# Patient Record
Sex: FEMALE | Race: WHITE | Employment: OTHER | URBAN - METROPOLITAN AREA
[De-identification: names, ages, dates, MRNs, and addresses within clinical notes are randomized per-mention and may not be internally consistent; named-entity substitution may affect disease eponyms.]

---

## 2017-11-02 ENCOUNTER — OFFICE VISIT (OUTPATIENT)
Dept: PULMONOLOGY | Age: 67
End: 2017-11-02

## 2017-11-02 VITALS
OXYGEN SATURATION: 98 % | SYSTOLIC BLOOD PRESSURE: 139 MMHG | WEIGHT: 194 LBS | DIASTOLIC BLOOD PRESSURE: 84 MMHG | HEART RATE: 105 BPM | BODY MASS INDEX: 35.7 KG/M2 | HEIGHT: 62 IN | RESPIRATION RATE: 16 BRPM | TEMPERATURE: 97.5 F

## 2017-11-02 DIAGNOSIS — Z99.89 OSA ON CPAP: Primary | ICD-10-CM

## 2017-11-02 DIAGNOSIS — Z71.89 CPAP USE COUNSELING: ICD-10-CM

## 2017-11-02 DIAGNOSIS — R53.83 FATIGUE, UNSPECIFIED TYPE: ICD-10-CM

## 2017-11-02 DIAGNOSIS — G47.33 OSA ON CPAP: Primary | ICD-10-CM

## 2017-11-02 DIAGNOSIS — R68.2 DRY MOUTH: ICD-10-CM

## 2017-11-02 PROCEDURE — 99213 OFFICE O/P EST LOW 20 MIN: CPT | Performed by: NURSE PRACTITIONER

## 2017-11-02 ASSESSMENT — SLEEP AND FATIGUE QUESTIONNAIRES
ESS TOTAL SCORE: 14
HOW LIKELY ARE YOU TO NOD OFF OR FALL ASLEEP WHILE SITTING AND READING: 3
HOW LIKELY ARE YOU TO NOD OFF OR FALL ASLEEP WHILE SITTING QUIETLY AFTER LUNCH WITHOUT ALCOHOL: 2
HOW LIKELY ARE YOU TO NOD OFF OR FALL ASLEEP WHILE WATCHING TV: 3
HOW LIKELY ARE YOU TO NOD OFF OR FALL ASLEEP IN A CAR, WHILE STOPPED FOR A FEW MINUTES IN TRAFFIC: 0
HOW LIKELY ARE YOU TO NOD OFF OR FALL ASLEEP IN A CAR, WHILE STOPPED FOR A FEW MINUTES IN TRAFFIC: 0
HOW LIKELY ARE YOU TO NOD OFF OR FALL ASLEEP WHILE LYING DOWN TO REST IN THE AFTERNOON WHEN CIRCUMSTANCES PERMIT: 2
HOW LIKELY ARE YOU TO NOD OFF OR FALL ASLEEP WHILE LYING DOWN TO REST IN THE AFTERNOON WHEN CIRCUMSTANCES PERMIT: 2
ESS TOTAL SCORE: 10
HOW LIKELY ARE YOU TO NOD OFF OR FALL ASLEEP WHILE SITTING INACTIVE IN A PUBLIC PLACE: 2
HOW LIKELY ARE YOU TO NOD OFF OR FALL ASLEEP WHILE SITTING QUIETLY AFTER LUNCH WITHOUT ALCOHOL: 1
HOW LIKELY ARE YOU TO NOD OFF OR FALL ASLEEP WHILE SITTING AND READING: 3
HOW LIKELY ARE YOU TO NOD OFF OR FALL ASLEEP WHEN YOU ARE A PASSENGER IN A CAR FOR AN HOUR WITHOUT A BREAK: 1
HOW LIKELY ARE YOU TO NOD OFF OR FALL ASLEEP WHILE SITTING AND TALKING TO SOMEONE: 1
HOW LIKELY ARE YOU TO NOD OFF OR FALL ASLEEP WHILE SITTING INACTIVE IN A PUBLIC PLACE: 1
HOW LIKELY ARE YOU TO NOD OFF OR FALL ASLEEP WHILE WATCHING TV: 2
NECK CIRCUMFERENCE (INCHES): 17
HOW LIKELY ARE YOU TO NOD OFF OR FALL ASLEEP WHILE SITTING AND TALKING TO SOMEONE: 0
HOW LIKELY ARE YOU TO NOD OFF OR FALL ASLEEP WHEN YOU ARE A PASSENGER IN A CAR FOR AN HOUR WITHOUT A BREAK: 1

## 2017-11-02 NOTE — PROGRESS NOTES
.Planning to get Influenza vaccine at PCP office today,  Orders verbalized by Gualberto Stroud CNP;  1 yr F/U  Supply orders faxed to [facility]: all masks, mask fitting, chin strap, heated tubing

## 2017-11-02 NOTE — PATIENT INSTRUCTIONS
Some people find it useful to assign a \"worry period\" during the evening or afternoon for these issues. CPAP Equipment Cleaning and Disinfecting Schedule  Equipment Cleaning Frequency Instructions  Disinfecting Frequency   Non-Disposable Filters  Weekly Mild soapy water, Rinse, Air Dry Not Required   Disposable Filters Change as needed  2-4 weeks Do Not Wash Not Required   Hose/tubing Daily Mild soapy water, Rinse, Air Dry Once a week   Mask / Nasal Pillows Daily Mild soapy water, Rinse, Air Dry Once a week   Headgear Weekly Hand wash, Mild soapy water, Rinse, Dry  Not Required   Humidifier Daily Empty water daily  Mild soapy water, Rinse well, Air Dry  Once a week   CPAP Unit As Needed Dust with damp cloth,  No detergents or sprays Not Required         Disinfect (per schedule) with 1 part white vinegar and 3 parts water- soak mask and water chamber for 30 minutes every 1-2 weeks, more often if sick. Allow water/vinegar mixture to run through tubing. Allow all equipment to air dry. Drying Hints:   Always hang tubing away from direct sunlight, as this will cause the tubing to become yellow, brittle and crack over a period of time. DO NOT attach the wet tubing to your CPAP unit to blow-dry it. The moisture from the tubing can drain back into your machine. Moisture in your unit can cause sudden pressure increases or short circuits  DO's and DON'Ts:  - Don't use alcohol-based products to clean your mask, because it can cause the materials to become hard and brittle. - Don't put headgear in the washer or dryer  - Don't use any caustic or household cleaning solutions such as bleach on your CPAP   equipment.  - Do follow the recommended cleaning schedule. - Do change your disposable filter frequently. Adapted From: [email or web address]. shtm.   These are general suggestions for all models please follow specific s recommendations and specific instructions

## 2018-10-29 ENCOUNTER — TELEPHONE (OUTPATIENT)
Dept: PULMONOLOGY | Age: 68
End: 2018-10-29

## 2018-12-18 ENCOUNTER — OFFICE VISIT (OUTPATIENT)
Dept: PULMONOLOGY | Age: 68
End: 2018-12-18
Payer: COMMERCIAL

## 2018-12-18 VITALS
HEART RATE: 79 BPM | OXYGEN SATURATION: 97 % | WEIGHT: 194 LBS | SYSTOLIC BLOOD PRESSURE: 157 MMHG | DIASTOLIC BLOOD PRESSURE: 98 MMHG | RESPIRATION RATE: 16 BRPM | TEMPERATURE: 98.1 F | BODY MASS INDEX: 35.7 KG/M2 | HEIGHT: 62 IN

## 2018-12-18 DIAGNOSIS — E66.9 OBESITY (BMI 30-39.9): ICD-10-CM

## 2018-12-18 DIAGNOSIS — G47.33 OSA ON CPAP: Primary | ICD-10-CM

## 2018-12-18 DIAGNOSIS — Z99.89 OSA ON CPAP: Primary | ICD-10-CM

## 2018-12-18 DIAGNOSIS — R68.2 DRY MOUTH: ICD-10-CM

## 2018-12-18 DIAGNOSIS — Z71.89 CPAP USE COUNSELING: ICD-10-CM

## 2018-12-18 PROCEDURE — 99213 OFFICE O/P EST LOW 20 MIN: CPT | Performed by: NURSE PRACTITIONER

## 2018-12-18 ASSESSMENT — SLEEP AND FATIGUE QUESTIONNAIRES
HOW LIKELY ARE YOU TO NOD OFF OR FALL ASLEEP WHEN YOU ARE A PASSENGER IN A CAR FOR AN HOUR WITHOUT A BREAK: 1
HOW LIKELY ARE YOU TO NOD OFF OR FALL ASLEEP WHILE SITTING AND TALKING TO SOMEONE: 0
HOW LIKELY ARE YOU TO NOD OFF OR FALL ASLEEP IN A CAR, WHILE STOPPED FOR A FEW MINUTES IN TRAFFIC: 0
NECK CIRCUMFERENCE (INCHES): 17
HOW LIKELY ARE YOU TO NOD OFF OR FALL ASLEEP WHILE LYING DOWN TO REST IN THE AFTERNOON WHEN CIRCUMSTANCES PERMIT: 2
HOW LIKELY ARE YOU TO NOD OFF OR FALL ASLEEP WHILE SITTING INACTIVE IN A PUBLIC PLACE: 1
HOW LIKELY ARE YOU TO NOD OFF OR FALL ASLEEP WHILE SITTING AND READING: 2
HOW LIKELY ARE YOU TO NOD OFF OR FALL ASLEEP WHILE SITTING QUIETLY AFTER LUNCH WITHOUT ALCOHOL: 0
HOW LIKELY ARE YOU TO NOD OFF OR FALL ASLEEP WHILE WATCHING TV: 2
ESS TOTAL SCORE: 8

## 2018-12-18 NOTE — PATIENT INSTRUCTIONS
Please keep all of your future appointments scheduled by [facility] [address], [address] Pulmonary and Sleep. Remember to bring your sleep machine, cord and mask to each appointment    You should have a follow up appointment scheduled with a sleep medicine provider within 12 months. Routine parts, masks, tubing and filters should all be obtainable from your DME (equipment) provider. Any problems related to mask fit, comfort or functioning of equipment should also be addressed directly with your DME provider. If you are unable to resolve problems, then please notify our office and schedule an appointment to be seen sooner than the usual follow up appointment. For all patients who are evaluated for sleep disorders, never drive or operate motorized equipment while sleepy, fatigued or groggy. Please bring in all of your sleep equipment to all of your appointments, including machine, mask, cords and hoses. Here are some tips to to getting better sleep  1- Avoid napping during the day: This will ensure you are tired at bedtime. If you have to take a nap, sleep less than one hour, before 3 pm.   2- Exercise regularly, but not right before bed: but the timing of the workout is important. Exercising in the morning or early afternoon will not interfere with sleep. Exercising within two hours before bedtime can decrease your ability to fall asleep. Regular exercise is recommended to help you deepen the sleep. 3- Avoid heavy, spicy, or sugary foods 4-6 hours before bedtime: These can affect your ability to stay asleep. 4- Have a light snack before bed: Having an empty stomach can interfere with your sleep. Dairy products and turkey contain tryptophan, which acts as a natural sleep inducer. 5- Stay away from caffeine, nicotine and alcohol at least 4-6 hours before bed: Caffeine and nicotine are stimulants that interfere with your ability to fall asleep.  While alcohol has an immediate sleep-inducing

## 2019-12-17 ENCOUNTER — TELEPHONE (OUTPATIENT)
Dept: PULMONOLOGY | Age: 69
End: 2019-12-17

## 2020-01-21 ENCOUNTER — OFFICE VISIT (OUTPATIENT)
Dept: PULMONOLOGY | Age: 70
End: 2020-01-21
Payer: MEDICARE

## 2020-01-21 VITALS
HEART RATE: 82 BPM | HEIGHT: 62 IN | DIASTOLIC BLOOD PRESSURE: 82 MMHG | WEIGHT: 198 LBS | BODY MASS INDEX: 36.44 KG/M2 | OXYGEN SATURATION: 97 % | TEMPERATURE: 97.8 F | RESPIRATION RATE: 16 BRPM | SYSTOLIC BLOOD PRESSURE: 136 MMHG

## 2020-01-21 PROCEDURE — 99213 OFFICE O/P EST LOW 20 MIN: CPT | Performed by: NURSE PRACTITIONER

## 2020-01-21 RX ORDER — ALBUTEROL SULFATE 90 UG/1
2 AEROSOL, METERED RESPIRATORY (INHALATION)
COMMUNITY

## 2020-01-21 ASSESSMENT — SLEEP AND FATIGUE QUESTIONNAIRES
ESS TOTAL SCORE: 9
HOW LIKELY ARE YOU TO NOD OFF OR FALL ASLEEP IN A CAR, WHILE STOPPED FOR A FEW MINUTES IN TRAFFIC: 0
HOW LIKELY ARE YOU TO NOD OFF OR FALL ASLEEP WHILE SITTING INACTIVE IN A PUBLIC PLACE: 0
NECK CIRCUMFERENCE (INCHES): 15.5
HOW LIKELY ARE YOU TO NOD OFF OR FALL ASLEEP WHILE LYING DOWN TO REST IN THE AFTERNOON WHEN CIRCUMSTANCES PERMIT: 3
HOW LIKELY ARE YOU TO NOD OFF OR FALL ASLEEP WHILE SITTING AND READING: 3
HOW LIKELY ARE YOU TO NOD OFF OR FALL ASLEEP WHILE WATCHING TV: 3
HOW LIKELY ARE YOU TO NOD OFF OR FALL ASLEEP WHEN YOU ARE A PASSENGER IN A CAR FOR AN HOUR WITHOUT A BREAK: 0
HOW LIKELY ARE YOU TO NOD OFF OR FALL ASLEEP WHILE SITTING QUIETLY AFTER LUNCH WITHOUT ALCOHOL: 0
HOW LIKELY ARE YOU TO NOD OFF OR FALL ASLEEP WHILE SITTING AND TALKING TO SOMEONE: 0

## 2020-01-21 NOTE — PROGRESS NOTES
lb (89.8 kg), SpO2 97 %.' on RA  Gen: No acute distress. Obese. BMI of 36.21  Eyes: PERRL. No sclera icterus. No conjunctival injection. ENT: No discharge. Pharynx clear. Mallampati class IV. Neck: Trachea midline. No obvious mass. Neck circumference 15.5\"  Resp: No accessory muscle use. No crackles. No wheezes. No rhonchi. CV: Regular rate. Regular rhythm. No murmur or rub. Skin: Warm and dry. M/S: No cyanosis. Neuro: Awake. Alert. Moves all four extremities. Psych: Oriented x 3. No anxiety. DATA :   11/30/2005- PSG AHI 43.1, low SpO2 66%  1/18/2006- titration-controlled sleep related breathing with CPAP 6 cm H2O  9/16/16- CPAP titration Controlled sleep related breathing with CPAP, PLMS 29.7, recommendations auto CPAP 7-11 cm H2O    CPAP compliance data:  Compliance download report from 10/3/17to 11/1/17 reviewed today by me and showed patient is using machine 6:33 hrs/night with 96.7% compliance and AHI 1.2 within this time frame. 29/30days with greater than 4 hours of machine use. 90% pressure 8.2 cm H20 on auto CPAP 7-11 cm H2O    Compliance download report from 11/17/18 to 12/16/18 reviewed today by me and showed patient is using machine 6:10 hrs/night with 86.7% compliance and AHI 1.0 within this time frame. 25/30days with greater than 4 hours of machine use. 90% pressure 8.5 cm H20 on auto CPAP 7-11 cm H2O    Compliance download report from 12/22/19 to 1/20/20 reviewed today by me and showed patient is using machine 8:20 hrs/night with 100% compliance and AHI 1.2 within this time frame. 30/30days with greater than 4 hours of machine use. 90% pressure 8.4 cm H20 on auto CPAP7-11 cm H2O      Assessment:       · Severe KENDELL. Auto CPAP 7-11 cmH2O. Nasal pillow. Optimal compliance and efficacy on review today.      · Snoring and witnessed apnea without CPAP  · Hypersomnia-improving  · Occasional dry mouth with CPAP      Plan:      · Continue auto CPAP 7-11 cm H2O   · Increase sleep time to at least 7 preferably 8 hours nightly  · Order heated tubing  · Order chin strap   · Can try different mask for comfort if patient wishes- mask fitting at [facility] [place]. Eulah Shine may be good option   · Advised to use CPAP 6-8 hrs at night and during naps. · Replacement of mask, tubing, head straps every 3-6 months or sooner if damaged. · Follow up CPAP compliance and pressure adjustment if needed  · Sleep hygiene  · Avoid sedatives, alcohol and caffeinated drinks at bed time. · No driving motorized vehicles or operating heavy machinery while fatigue, drowsy or sleepy. · Weight loss is also recommended as a long-term intervention. · Complications of KENDELL if not treated were discussed with patient patient to include systemic hypertension, pulmonary hypertension, cardiovascular morbidities, car accidents and all cause mortality.   · Patient education handout provided regarding sleep tips and CPAP cleaning recommendations       Follow up 1 year sooner if needed

## 2020-12-09 ENCOUNTER — TELEPHONE (OUTPATIENT)
Dept: PULMONOLOGY | Age: 70
End: 2020-12-09

## 2020-12-09 NOTE — TELEPHONE ENCOUNTER
limited to the following:    Your Provider(s) may not able to provide medical treatment for your particular condition and you may be required to seek alternative healthcare or emergency care services.  The electronic systems or other security protocols or safeguards used in the Service could fail, causing a breach of privacy of your medical or other information.  Given regulatory requirements in certain jurisdictions, your Provider(s) diagnosis and/or treatment options, especially pertaining to certain prescriptions, may be limited. Acceptance   1. You understand that Services will be provided via Telehealth. This process involves the use of HIPAA compliant and secure, real-time audio-visual interfacing with a qualified and appropriately trained provider located at [facility]. 2. You understand that, under no circumstances, will this session be recorded. 3. You understand that the Provider(s) at [facility] and other clinical participants will be party to the information obtained during the Telehealth session in accordance with best medical practices. 4. You understand that the information obtained during the Telehealth session will be used to help determine the most appropriate treatment options. 5. You understand that You have the right to revoke this consent at any point in time. 6. You understand that Telehealth is voluntary, and that continued treatment is not dependent upon consent. 7. You understand that, in the event of non-consent to Telehealth services and/or technical difficulties, you will obtain services as typically provided in the absence of Telehealth technology. 8. You understand that this consent will be kept in Your medical record. 9. No potential benefits from the use of Telehealth or specific results can be guaranteed. Your condition may not be cured or improved and, in some cases, may get worse.    10. There are limitations in the terms described in the Terms of Service and this Telehealth Consent. The patient was read the following statement and has consented to the visit as of 12/9/20. The patient has been scheduled for their first telehealth visit on 12/10/20 with [facility].

## 2020-12-10 ENCOUNTER — VIRTUAL VISIT (OUTPATIENT)
Dept: PULMONOLOGY | Age: 70
End: 2020-12-10
Payer: MEDICARE

## 2020-12-10 PROCEDURE — 99442 PR PHYS/QHP TELEPHONE EVALUATION 11-20 MIN: CPT | Performed by: NURSE PRACTITIONER

## 2020-12-10 RX ORDER — IBUPROFEN 800 MG/1
800 TABLET ORAL EVERY 6 HOURS PRN
COMMUNITY

## 2020-12-10 RX ORDER — SIMVASTATIN 40 MG
40 TABLET ORAL NIGHTLY
COMMUNITY

## 2020-12-10 ASSESSMENT — SLEEP AND FATIGUE QUESTIONNAIRES
HOW LIKELY ARE YOU TO NOD OFF OR FALL ASLEEP WHILE LYING DOWN TO REST IN THE AFTERNOON WHEN CIRCUMSTANCES PERMIT: 0
ESS TOTAL SCORE: 3
HOW LIKELY ARE YOU TO NOD OFF OR FALL ASLEEP WHILE SITTING AND READING: 2
HOW LIKELY ARE YOU TO NOD OFF OR FALL ASLEEP WHEN YOU ARE A PASSENGER IN A CAR FOR AN HOUR WITHOUT A BREAK: 1
HOW LIKELY ARE YOU TO NOD OFF OR FALL ASLEEP WHILE WATCHING TV: 0
HOW LIKELY ARE YOU TO NOD OFF OR FALL ASLEEP WHILE SITTING INACTIVE IN A PUBLIC PLACE: 0
HOW LIKELY ARE YOU TO NOD OFF OR FALL ASLEEP IN A CAR, WHILE STOPPED FOR A FEW MINUTES IN TRAFFIC: 0
HOW LIKELY ARE YOU TO NOD OFF OR FALL ASLEEP WHILE SITTING AND TALKING TO SOMEONE: 0
HOW LIKELY ARE YOU TO NOD OFF OR FALL ASLEEP WHILE SITTING QUIETLY AFTER LUNCH WITHOUT ALCOHOL: 0

## 2020-12-10 NOTE — PATIENT INSTRUCTIONS

## 2020-12-10 NOTE — PROGRESS NOTES
CHIEF COMPLAINT: KENDELL    Consulting provider: Sharlotte Apley, APRN - CNP    Corinne Wilder Baker is a 79 y.o. female for telephone only virtual visit for KENDELL follow up. States she is doing okay with CPAP. States mask is annoying, tubing gets in the way. States knows she benefits from CPAP, has HA if doesn't use it. Patient is using CPAP  8-9 hrs/night. Using humidifier. No snoring on CPAP. The pressure is well tolerated. The mask is not very comfortable- nasal pillows. No mask leak. No significant daytime sleepiness. No nodding off when driving. Minimal dry mouth has a chin strap but does not use. No fatigue. Bedtime is 10-11 pm and rise time is 8 am. Sleep onset is few minutes. Wakes up 1-2 times at night total. 1-2 nocturia. It takes usually few minutes to fall back a sleep. Rare naps during the day. No headache in am. No weight gain. 0-1 caffienated beverages during the day. No alcohol. ESS is 3. Past Medical History:   Diagnosis Date    Arthritis     Hypertension     KENDELL on CPAP     Pneumonia        Past Surgical History:        Procedure Laterality Date    HERNIA REPAIR      KNEE SURGERY         Allergies:  is allergic to clindamycin and naproxen. Social History:    TOBACCO:   reports that she has never smoked. She has never used smokeless tobacco.  ETOH:   reports no history of alcohol use. Family History:   No KENDELL    Current Medications:    Current Outpatient Medications:     albuterol sulfate  (90 Base) MCG/ACT inhaler, Inhale 2 puffs into the lungs, Disp: , Rfl:     diclofenac sodium 1 % GEL, 4 g by Intratympanic route, Disp: , Rfl:     Oxaprozin (DAYPRO PO), Take by mouth as needed, Disp: , Rfl:     Amlodipine Besy-Benazepril HCl (LOTREL PO), Take 1 tablet by mouth daily. , Disp: , Rfl:     fexofenadine (ALLEGRA) 180 MG tablet, Take 180 mg by mouth daily.   , Disp: , Rfl:       Objective:   PHYSICAL EXAM:    There were no vitals taken for this visit.' on RA      DATA : 11/30/2005- PSG AHI 43.1, low SpO2 66%  1/18/2006- titration-controlled sleep related breathing with CPAP 6 cm H2O  9/16/16- CPAP titration Controlled sleep related breathing with CPAP, PLMS 29.7, recommendations auto CPAP 7-11 cm H2O    CPAP compliance data:  Compliance download report from 10/3/17to 11/1/17 reviewed today by me and showed patient is using machine 6:33 hrs/night with 96.7% compliance and AHI 1.2 within this time frame. 29/30days with greater than 4 hours of machine use. 90% pressure 8.2 cm H20 on auto CPAP 7-11 cm H2O    Compliance download report from 11/17/18 to 12/16/18 reviewed today by me and showed patient is using machine 6:10 hrs/night with 86.7% compliance and AHI 1.0 within this time frame. 25/30days with greater than 4 hours of machine use. 90% pressure 8.5 cm H20 on auto CPAP 7-11 cm H2O    Compliance download report from 12/22/19 to 1/20/20 reviewed today by me and showed patient is using machine 8:20 hrs/night with 100% compliance and AHI 1.2 within this time frame. 30/30days with greater than 4 hours of machine use. 90% pressure 8.4 cm H20 on auto CPAP7-11 cm H2O    Compliance download report from 11/7/20 to 12/6/20 reviewed today by me and showed patient is using machine 8:43 hrs/night with 100% compliance and AHI 1.6 within this time frame. 30/30days with greater than 4 hours of machine use. 90% pressure 8.4 cm H20 on auto CPAP 7-11 cm H2O    Assessment:       · Severe KENDELL. Auto CPAP 7-11 cmH2O. Nasal pillow. Optimal compliance and efficacy on review today. · Snoring and witnessed apnea without CPAP  · Hypersomnia-improving  · Occasional dry mouth with CPAP      Plan:      · Continue auto CPAP 7-11 cm H2O   · Order heated tubing  · Can try different mask for comfort if patient wishes- mask fitting at [facility] [place]. Dreamwear nasal mask/ ResMed N30i may be good option as tubing comes from the top  · Advised to use CPAP 6-8 hrs at night and during naps.    · Replacement of mask, tubing, head straps every 3-6 months or sooner if damaged. · Follow up CPAP compliance and pressure adjustment if needed  · Sleep hygiene  · Avoid sedatives, alcohol and caffeinated drinks at bed time. · No driving motorized vehicles or operating heavy machinery while fatigue, drowsy or sleepy. · Weight loss is also recommended as a long-term intervention. · Complications of KENDELL if not treated were discussed with patient patient to include systemic hypertension, pulmonary hypertension, cardiovascular morbidities, car accidents and all cause mortality. · Patient education  regarding sleep tips and CPAP cleaning recommendations       Follow up 1 year sooner if needed    Consent for telehealth visit was obtained and is noted in chart    Jovanna Miranda is a 79 y.o. female evaluated via telephone on 12/10/2020. I affirm this is a Patient Initiated Episode with a Patient who has not had a related appointment within my department in the past 7 days or scheduled within the next 24 hours.     Patient identification was verified at the start of the visit: Yes    Total Time: minutes: 11-20 minutes    Note: not billable if this call serves to triage the patient into an appointment for the relevant concern      Emeka Byrne

## 2021-12-15 ENCOUNTER — VIRTUAL VISIT (OUTPATIENT)
Dept: PULMONOLOGY | Age: 71
End: 2021-12-15
Payer: MEDICARE

## 2021-12-15 ENCOUNTER — TELEPHONE (OUTPATIENT)
Dept: PULMONOLOGY | Age: 71
End: 2021-12-15

## 2021-12-15 DIAGNOSIS — Z71.89 CPAP USE COUNSELING: ICD-10-CM

## 2021-12-15 DIAGNOSIS — E66.9 OBESITY (BMI 30-39.9): ICD-10-CM

## 2021-12-15 DIAGNOSIS — G47.33 SEVERE OBSTRUCTIVE SLEEP APNEA: Primary | ICD-10-CM

## 2021-12-15 PROCEDURE — 99213 OFFICE O/P EST LOW 20 MIN: CPT | Performed by: NURSE PRACTITIONER

## 2021-12-15 ASSESSMENT — SLEEP AND FATIGUE QUESTIONNAIRES
HOW LIKELY ARE YOU TO NOD OFF OR FALL ASLEEP WHILE WATCHING TV: 1
ESS TOTAL SCORE: 9
HOW LIKELY ARE YOU TO NOD OFF OR FALL ASLEEP IN A CAR, WHILE STOPPED FOR A FEW MINUTES IN TRAFFIC: 0
HOW LIKELY ARE YOU TO NOD OFF OR FALL ASLEEP WHILE LYING DOWN TO REST IN THE AFTERNOON WHEN CIRCUMSTANCES PERMIT: 3
HOW LIKELY ARE YOU TO NOD OFF OR FALL ASLEEP WHILE SITTING AND TALKING TO SOMEONE: 0
HOW LIKELY ARE YOU TO NOD OFF OR FALL ASLEEP WHILE SITTING AND READING: 1
HOW LIKELY ARE YOU TO NOD OFF OR FALL ASLEEP WHILE SITTING QUIETLY AFTER LUNCH WITHOUT ALCOHOL: 3
HOW LIKELY ARE YOU TO NOD OFF OR FALL ASLEEP WHILE SITTING INACTIVE IN A PUBLIC PLACE: 0
HOW LIKELY ARE YOU TO NOD OFF OR FALL ASLEEP WHEN YOU ARE A PASSENGER IN A CAR FOR AN HOUR WITHOUT A BREAK: 1

## 2021-12-15 NOTE — PROGRESS NOTES
CHIEF COMPLAINT: KENDELL    Consulting provider: ISAIAH Beyer - CNP Corinne L Geovanni Higgins is a 70 y.o. female for televideo appointment via video and audio doxy. me virtual visit for KENDELL follow up. States she is doing pretty well with CPAP. Patient is using CPAP   7-8 hrs/night. Using humidifier. No snoring on CPAP. The pressure is well tolerated. The mask is somewhat comfortable- nasal mask dreamwear. No mask leak. No significant daytime sleepiness. Denies nodding off when driving. +dry mouth at times, uses biotene with some benefit. +fatigue. Bedtime is 11 pm and rise time is 730-8 am. Sleep onset is usually few minutes. Wakes up 3 times at night total. 3 nocturia. States she did also just have surgery and is not sleeping well due to this. It takes usually few minutes to fall back a sleep. 1 naps during the day- 20 minutes. No headache in am. No weight gain. No caffienated beverages during the day. No alcohol. ESS is 9        Past Medical History:   Diagnosis Date    Arthritis     Hypertension     KENDELL on CPAP     Pneumonia        Past Surgical History:        Procedure Laterality Date    HERNIA REPAIR      KNEE SURGERY         Allergies:  is allergic to clindamycin and naproxen. Social History:    TOBACCO:   reports that she has never smoked. She has never used smokeless tobacco.  ETOH:   reports no history of alcohol use.       Family History:   No KENDELL    Current Medications:    Current Outpatient Medications:     simvastatin (ZOCOR) 40 MG tablet, Take 40 mg by mouth nightly, Disp: , Rfl:     ibuprofen (ADVIL;MOTRIN) 800 MG tablet, Take 800 mg by mouth every 6 hours as needed for Pain, Disp: , Rfl:     albuterol sulfate  (90 Base) MCG/ACT inhaler, Inhale 2 puffs into the lungs, Disp: , Rfl:     diclofenac sodium 1 % GEL, 4 g by Intratympanic route, Disp: , Rfl:     Oxaprozin (DAYPRO PO), Take by mouth as needed, Disp: , Rfl:     Amlodipine Besy-Benazepril HCl (LOTREL PO), Take 1 tablet by mouth daily. , Disp: , Rfl:     fexofenadine (ALLEGRA) 180 MG tablet, Take 180 mg by mouth daily. , Disp: , Rfl:       Objective:   PHYSICAL EXAM:    There were no vitals taken for this visit.' on RA  Exam:  Gen: No acute distress, does not appear to be in pain. Appears well developed and nourished. HENT: Head is normocephalic and atraumatic. Normal appearing nose. External Ears normal.   Neck: No visualized mass. Trachea is midline   Eyes: EOM intact. No visible discharge. Resp:No visualized signs of difficulty breathing or respiratory distress, speaking in full sentences. Respiratory effort normal.  Neuro: Awake. Alert. Able to follow commands. No facial asymmetry. Skin: No significant lesions or discoloration noted on facial skin    Musculoskeletal: Normal range of motion of the neck. Psych: Oriented x 3. No anxiety. Normal affect. DATA :   11/30/2005- PSG AHI 43.1, low SpO2 66%  1/18/2006- titration-controlled sleep related breathing with CPAP 6 cm H2O  9/16/16- CPAP titration Controlled sleep related breathing with CPAP, PLMS 29.7, recommendations auto CPAP 7-11 cm H2O    CPAP compliance data:  Compliance download report from 10/3/17to 11/1/17 reviewed today by me and showed patient is using machine 6:33 hrs/night with 96.7% compliance and AHI 1.2 within this time frame. 29/30days with greater than 4 hours of machine use. 90% pressure 8.2 cm H20 on auto CPAP 7-11 cm H2O    Compliance download report from 11/17/18 to 12/16/18 reviewed today by me and showed patient is using machine 6:10 hrs/night with 86.7% compliance and AHI 1.0 within this time frame. 25/30days with greater than 4 hours of machine use. 90% pressure 8.5 cm H20 on auto CPAP 7-11 cm H2O    Compliance download report from 12/22/19 to 1/20/20 reviewed today by me and showed patient is using machine 8:20 hrs/night with 100% compliance and AHI 1.2 within this time frame.   30/30days with greater than 4 hours of machine use.  90% pressure 8.4 cm H20 on auto CPAP7-11 cm H2O    Compliance download report from 11/7/20 to 12/6/20 reviewed today by me and showed patient is using machine 8:43 hrs/night with 100% compliance and AHI 1.6 within this time frame. 30/30days with greater than 4 hours of machine use. 90% pressure 8.4 cm H20 on auto CPAP 7-11 cm H2O    Compliance download report from 11/11/21 to 12/12/21 reviewed today by me and showed patient is using machine 7:23 hrs/night with 94% compliance and AHI 1.9 within this time frame. 30/32days with greater than 4 hours of machine use. 90% pressure 8.2 cm H20 on auto CPAP 7-11 cm H2O    Assessment:       · Severe KENDELL. Auto CPAP 7-11 cmH2O. Nasal pillow. Optimal compliance and efficacy on review today. · Snoring and witnessed apnea without CPAP  · Hypersomnia-improving  · Occasional dry mouth with CPAP      Plan:      · Continue auto CPAP 7-11 cm H2O   · Heated tubing  · Try chin strap- patient states she has one  · May need to check with DME for mask fit  · Advised to use CPAP 6-8 hrs at night and during naps. · Replacement of mask, tubing, head straps every 3-6 months or sooner if damaged. · Follow up CPAP compliance and pressure adjustment if needed  · Sleep hygiene  · Avoid sedatives, alcohol and caffeinated drinks at bed time. · No driving motorized vehicles or operating heavy machinery while fatigue, drowsy or sleepy. · Weight loss is also recommended as a long-term intervention. · Complications of KENDELL if not treated were discussed with patient patient to include systemic hypertension, pulmonary hypertension, cardiovascular morbidities, car accidents and all cause mortality. · Patient education  regarding sleep tips and CPAP cleaning recommendations     -Discussed [employer] recently recalled some Pap units. Patient encouraged to call DME/ to see if her unit is on recall and register it if so.   Discussed risks/benefits of untreated sleep apnea as well as risks/benefits of use of unit if recalled. At this time, if patients have moderate to severe sleep apnea or have severe daytime sleepiness, it is recommended continue therapy until the machine can be replaced. Based upon the information we have so far, it appears the risk of stopping therapy may be higher than the risks associated with foam degradation. However, there are still many unknown variables and each patient will have to make a final determination on his or her own. Discussed alternative treatment options. She declines new CPAP at this time. States plans to continue current CPAP until can get a replacement    Please only use cleaning methods recommended by . Follow up 1 year sooner if needed  Consent for telehealth visit was obtained and is noted in chart      C/ [address]. Indira Bailey is a 70 y.o. female being evaluated by a Virtual Visit (video visit) encounter to address concerns as mentioned above. A caregiver was present when appropriate. Due to this being a TeleHealth encounter (During [place] [address] public health emergency), evaluation of the following organ systems was limited: Vitals/Constitutional/EENT/Resp/CV/GI//MS/Neuro/Skin/Heme-Lymph-Imm. Pursuant to the emergency declaration under the [facility], [address] [place] [address] authority and the [employer] and [employer] Act, this Virtual Visit was conducted with patient's (and/or legal guardian's) consent, to reduce the patient's risk of exposure to COVID-19 and provide necessary medical care. The patient (and/or legal guardian) has also been advised to contact this office for worsening conditions or problems, and seek emergency medical treatment and/or call 911 if deemed necessary.      Patient identification was verified at the start of the visit: Yes    Total time spent for this encounter: Not billed by time    Services were provided through a video synchronous discussion virtually to substitute for in-person clinic visit. Patient and provider were located at their individual homes. --ISAIHA Guevara CNP on 12/15/2021 at 10:01 AM    An electronic signature was used to authenticate this note.

## 2021-12-15 NOTE — PATIENT INSTRUCTIONS

## 2021-12-15 NOTE — TELEPHONE ENCOUNTER
Within this Telehealth Consent, the terms you and yours refer to the person using the Telehealth Service (Service), or in the case of a use of the Service by or on behalf of a minor, you and yours refer to and include (i) the parent or legal guardian who provides consent to the use of the Service by such minor or uses the Service on behalf of such minor, and (ii) the minor for whom consent is being provided or on whose behalf the Service is being utilized. When using Service, you will be consulting with your health care providers via the use of Telehealth.   Telehealth involves the delivery of healthcare services using electronic communications, information technology or other means between a healthcare provider and a patient who are not in the same physical location. Telehealth may be used for diagnosis, treatment, follow-up and/or patient education, and may include, but is not limited to, one or more of the following:    Electronic transmission of medical records, photo images, personal health information or other data between a patient and a healthcare provider    Interactions between a patient and healthcare provider via audio, video and/or data communications    Use of output data from medical devices, sound and video files    Anticipated Benefits   The use of Telehealth by your Provider(s) through the Service may have the following possible benefits:    Making it easier and more efficient for you to access medical care and treatment for the conditions treated by such Provider(s) utilizing the Service    Allowing you to obtain medical care and treatment by Provider(s) at times that are convenient for you    Enabling you to interact with Provider(s) without the necessity of an in-office appointment     Possible Risks   While the use of Telehealth can provide potential benefits for you, there are also potential risks associated with the use of Telehealth.  These risks include, but may not be limited to the following:    Your Provider(s) may not able to provide medical treatment for your particular condition and you may be required to seek alternative healthcare or emergency care services.  The electronic systems or other security protocols or safeguards used in the Service could fail, causing a breach of privacy of your medical or other information.  Given regulatory requirements in certain jurisdictions, your Provider(s) diagnosis and/or treatment options, especially pertaining to certain prescriptions, may be limited. Acceptance   1. You understand that Services will be provided via Telehealth. This process involves the use of HIPAA compliant and secure, real-time audio-visual interfacing with a qualified and appropriately trained provider located at [facility]. 2. You understand that, under no circumstances, will this session be recorded. 3. You understand that the Provider(s) at [facility] and other clinical participants will be party to the information obtained during the Telehealth session in accordance with best medical practices. 4. You understand that the information obtained during the Telehealth session will be used to help determine the most appropriate treatment options. 5. You understand that You have the right to revoke this consent at any point in time. 6. You understand that Telehealth is voluntary, and that continued treatment is not dependent upon consent. 7. You understand that, in the event of non-consent to Telehealth services and/or technical difficulties, you will obtain services as typically provided in the absence of Telehealth technology. 8. You understand that this consent will be kept in Your medical record. 9. No potential benefits from the use of Telehealth or specific results can be guaranteed. Your condition may not be cured or improved and, in some cases, may get worse.    10. There are limitations in the provision of medical care and treatment via Telehealth and the Service and you may not be able to receive diagnosis and/or treatment through the Service for every condition for which you seek diagnosis and/or treatment. 11. There are potential risks to the use of Telehealth, including but not limited to the risks described in this Telehealth Consent. 12. Your Provider(s) have discussed the use of Telehealth and the Service with you, including the benefits and risks of such and you have provided oral consent to your Provider(s) for the use of Telehealth and the Service. 15. You understand that it is your duty to provide your Provider(s) truthful, accurate and complete information, including all relevant information regarding care that you may have received or may be receiving from other healthcare providers outside of the Service. 14. You understand that each of your Provider(s) may determine in his or sole discretion that your condition is not suitable for diagnosis and/or treatment using the Service, and that you may need to seek medical care and treatment a specialist or other healthcare provider, outside of the Service. 15. You understand that you are fully responsible for payment for all services provided by Provider(s) or through use of the Service and that you may not be able to use third-party insurance. 16. You represent that (a) you have read this Telehealth Consent carefully, (b) you understand the risks and benefits of the Service and the use of Telehealth in the medical care and treatment provided to you by Provider(s) using the Service, and (c) you have the legal capacity and authority to provide this consent for yourself and/or the minor for which you are consenting under applicable federal and state laws, including laws relating to the age of [de-identified] and/or parental/guardian consent.    17. You give your informed consent to the use of Telehealth by Provider(s) using the Service under the terms described in the Terms of Service and this Telehealth Consent. The patient was read the following statement and has consented to the visit as of 12/15/21. The patient has been scheduled for their first telehealth visit on 12/15/2021 with Alicia Alonso.

## 2022-12-15 ENCOUNTER — OFFICE VISIT (OUTPATIENT)
Dept: SLEEP MEDICINE | Age: 72
End: 2022-12-15
Payer: MEDICARE

## 2022-12-15 VITALS
HEIGHT: 61 IN | RESPIRATION RATE: 18 BRPM | TEMPERATURE: 97.5 F | SYSTOLIC BLOOD PRESSURE: 136 MMHG | OXYGEN SATURATION: 98 % | HEART RATE: 90 BPM | WEIGHT: 179 LBS | BODY MASS INDEX: 33.79 KG/M2 | DIASTOLIC BLOOD PRESSURE: 80 MMHG

## 2022-12-15 DIAGNOSIS — G47.33 SEVERE OBSTRUCTIVE SLEEP APNEA: Primary | ICD-10-CM

## 2022-12-15 DIAGNOSIS — Z71.89 CPAP USE COUNSELING: ICD-10-CM

## 2022-12-15 DIAGNOSIS — E66.9 OBESITY (BMI 30-39.9): ICD-10-CM

## 2022-12-15 PROCEDURE — 1123F ACP DISCUSS/DSCN MKR DOCD: CPT | Performed by: NURSE PRACTITIONER

## 2022-12-15 PROCEDURE — 99213 OFFICE O/P EST LOW 20 MIN: CPT | Performed by: NURSE PRACTITIONER

## 2022-12-15 ASSESSMENT — SLEEP AND FATIGUE QUESTIONNAIRES
HOW LIKELY ARE YOU TO NOD OFF OR FALL ASLEEP IN A CAR, WHILE STOPPED FOR A FEW MINUTES IN TRAFFIC: 0
HOW LIKELY ARE YOU TO NOD OFF OR FALL ASLEEP WHILE WATCHING TV: 3
HOW LIKELY ARE YOU TO NOD OFF OR FALL ASLEEP WHILE SITTING AND TALKING TO SOMEONE: 0
HOW LIKELY ARE YOU TO NOD OFF OR FALL ASLEEP WHILE LYING DOWN TO REST IN THE AFTERNOON WHEN CIRCUMSTANCES PERMIT: 3
HOW LIKELY ARE YOU TO NOD OFF OR FALL ASLEEP WHILE SITTING AND READING: 3
HOW LIKELY ARE YOU TO NOD OFF OR FALL ASLEEP WHILE SITTING INACTIVE IN A PUBLIC PLACE: 0
HOW LIKELY ARE YOU TO NOD OFF OR FALL ASLEEP WHEN YOU ARE A PASSENGER IN A CAR FOR AN HOUR WITHOUT A BREAK: 2
NECK CIRCUMFERENCE (INCHES): 14.25
HOW LIKELY ARE YOU TO NOD OFF OR FALL ASLEEP WHILE SITTING QUIETLY AFTER LUNCH WITHOUT ALCOHOL: 0
ESS TOTAL SCORE: 11

## 2022-12-15 NOTE — PATIENT INSTRUCTIONS

## 2022-12-15 NOTE — PROGRESS NOTES
CHIEF COMPLAINT: KENDELL    Consulting provider: Christina Khan, APRN - CNP Corinne L Carri Pepper is a 67 y.o. female in office for KENDELL follow up. States she did receive replacement CPAP from the  for the recall. Patient is using CPAP 8 hrs/night. Using humidifier. No snoring on CPAP. The pressure is well tolerated, states feels too low does have ramp on. The mask is comfortable- nasal pillows P30i. No mask leak. No significant daytime sleepiness. No nodding off when driving. No dry nose or throat. Some fatigue, feels more boredom than sleepiness. Bedtime is 11 pm- MN and rise time is 7-9 am. Sleep onset is usually few minutes. Wakes up 2 times at night total. 2 nocturia. It takes few minutes to fall back a sleep. Occasional naps during the day. No headache in am. No weight gain. No caffienated beverages during the day. No alcohol. ESS is 11          Past Medical History:   Diagnosis Date    Arthritis     Hypertension     KENDELL on CPAP     Pneumonia        Past Surgical History:        Procedure Laterality Date    HERNIA REPAIR      KNEE SURGERY      ROTATOR CUFF REPAIR Right        Allergies:  is allergic to clindamycin and naproxen. Social History:    TOBACCO:   reports that she has never smoked. She has never used smokeless tobacco.  ETOH:   reports no history of alcohol use. Family History:   No KENDELL    Current Medications:    Current Outpatient Medications:     simvastatin (ZOCOR) 40 MG tablet, Take 40 mg by mouth nightly, Disp: , Rfl:     ibuprofen (ADVIL;MOTRIN) 800 MG tablet, Take 800 mg by mouth every 6 hours as needed for Pain, Disp: , Rfl:     albuterol sulfate  (90 Base) MCG/ACT inhaler, Inhale 2 puffs into the lungs, Disp: , Rfl:     diclofenac sodium 1 % GEL, 4 g by Intratympanic route, Disp: , Rfl:     Oxaprozin (DAYPRO PO), Take by mouth as needed, Disp: , Rfl:     Amlodipine Besy-Benazepril HCl (LOTREL PO), Take 1 tablet by mouth daily.   , Disp: , Rfl:     fexofenadine (ALLEGRA) 180 MG tablet, Take 180 mg by mouth daily, Disp: , Rfl:       Objective:   PHYSICAL EXAM:    Blood pressure 136/80, pulse 90, temperature 97.5 °F (36.4 °C), resp. rate 18, height 5' 1\" (1.549 m), weight 179 lb (81.2 kg), SpO2 98 %.' on RA  Gen: No acute distress. Obese. BMI of 33.82  Eyes: PERRL. No sclera icterus. No conjunctival injection. ENT: No discharge. Neck: Trachea midline. No obvious mass. Neck circumference 14.25\"  Resp: No accessory muscle use. No crackles. No wheezes. No rhonchi. CV: Regular rate. Regular rhythm. No murmur or rub. Skin: Warm and dry. M/S: No cyanosis. No obvious joint deformity. Neuro: Awake. Alert. Moves all four extremities. Psych: Oriented x 3. No anxiety. DATA :   11/30/2005- PSG AHI 43.1, low SpO2 66%  1/18/2006- titration-controlled sleep related breathing with CPAP 6 cm H2O  9/16/16- CPAP titration Controlled sleep related breathing with CPAP, PLMS 29.7, recommendations auto CPAP 7-11 cm H2O    CPAP compliance data:  Compliance download report from 10/3/17to 11/1/17 reviewed today by me and showed patient is using machine 6:33 hrs/night with 96.7% compliance and AHI 1.2 within this time frame. 29/30days with greater than 4 hours of machine use. 90% pressure 8.2 cm H20 on auto CPAP 7-11 cm H2O    Compliance download report from 11/17/18 to 12/16/18 reviewed today by me and showed patient is using machine 6:10 hrs/night with 86.7% compliance and AHI 1.0 within this time frame. 25/30days with greater than 4 hours of machine use. 90% pressure 8.5 cm H20 on auto CPAP 7-11 cm H2O    Compliance download report from 12/22/19 to 1/20/20 reviewed today by me and showed patient is using machine 8:20 hrs/night with 100% compliance and AHI 1.2 within this time frame. 30/30days with greater than 4 hours of machine use.   90% pressure 8.4 cm H20 on auto CPAP7-11 cm H2O    Compliance download report from 11/7/20 to 12/6/20 reviewed today by me and showed patient is using machine 8:43 hrs/night with 100% compliance and AHI 1.6 within this time frame. 30/30days with greater than 4 hours of machine use. 90% pressure 8.4 cm H20 on auto CPAP 7-11 cm H2O    Compliance download report from 11/11/21 to 12/12/21 reviewed today by me and showed patient is using machine 7:23 hrs/night with 94% compliance and AHI 1.9 within this time frame. 30/32days with greater than 4 hours of machine use. 90% pressure 8.2 cm H20 on auto CPAP 7-11 cm H2O    Replacement DS 2:  Compliance download report from 11/13/22 to 12/12/22 reviewed today by me and showed patient is using machine 8:05 hrs/night with 100% compliance and AHI 1.8 within this time frame. 30/30days with greater than 4 hours of machine use. 90% pressure 8 cm H20 on auto CPAP 7-11 cm H2O     Assessment:       Severe KENDELL. Auto CPAP 7-11 cmH2O. Nasal pillow. Optimal compliance and efficacy on review today. Snoring and witnessed apnea without CPAP  Hypersomnia-improving  Occasional dry mouth with CPAP      Plan:      Continue auto CPAP 7-11 cm H2O   Heated tubing  Turned ramp off  Advised to use CPAP 6-8 hrs at night and during naps. Replacement of mask, tubing, head straps every 3-6 months or sooner if damaged. Follow up CPAP compliance and pressure adjustment if needed  Sleep hygiene  Avoid sedatives, alcohol and caffeinated drinks at bed time. No driving motorized vehicles or operating heavy machinery while fatigue, drowsy or sleepy. Weight loss is also recommended as a long-term intervention. Complications of KENDELL if not treated were discussed with patient patient to include systemic hypertension, pulmonary hypertension, cardiovascular morbidities, car accidents and all cause mortality.   Patient education  regarding sleep tips and CPAP cleaning recommendations       Follow up 1 year sooner if needed

## 2024-01-31 ENCOUNTER — TELEPHONE (OUTPATIENT)
Dept: PULMONOLOGY | Age: 74
End: 2024-01-31

## 2024-01-31 ENCOUNTER — OFFICE VISIT (OUTPATIENT)
Dept: SLEEP MEDICINE | Age: 74
End: 2024-01-31
Payer: MEDICARE

## 2024-01-31 VITALS
HEIGHT: 61 IN | DIASTOLIC BLOOD PRESSURE: 64 MMHG | OXYGEN SATURATION: 95 % | HEART RATE: 88 BPM | SYSTOLIC BLOOD PRESSURE: 128 MMHG | BODY MASS INDEX: 36.25 KG/M2 | WEIGHT: 192 LBS

## 2024-01-31 DIAGNOSIS — I10 PRIMARY HYPERTENSION: ICD-10-CM

## 2024-01-31 DIAGNOSIS — R53.83 OTHER FATIGUE: ICD-10-CM

## 2024-01-31 DIAGNOSIS — Z71.89 CPAP USE COUNSELING: ICD-10-CM

## 2024-01-31 DIAGNOSIS — E66.9 OBESITY (BMI 30-39.9): ICD-10-CM

## 2024-01-31 DIAGNOSIS — G47.33 SEVERE OBSTRUCTIVE SLEEP APNEA: Primary | ICD-10-CM

## 2024-01-31 PROCEDURE — 99214 OFFICE O/P EST MOD 30 MIN: CPT | Performed by: NURSE PRACTITIONER

## 2024-01-31 PROCEDURE — 3078F DIAST BP <80 MM HG: CPT | Performed by: NURSE PRACTITIONER

## 2024-01-31 PROCEDURE — 3074F SYST BP LT 130 MM HG: CPT | Performed by: NURSE PRACTITIONER

## 2024-01-31 PROCEDURE — 1123F ACP DISCUSS/DSCN MKR DOCD: CPT | Performed by: NURSE PRACTITIONER

## 2024-01-31 ASSESSMENT — SLEEP AND FATIGUE QUESTIONNAIRES
HOW LIKELY ARE YOU TO NOD OFF OR FALL ASLEEP WHILE SITTING AND READING: 3
HOW LIKELY ARE YOU TO NOD OFF OR FALL ASLEEP WHEN YOU ARE A PASSENGER IN A CAR FOR AN HOUR WITHOUT A BREAK: 0
HOW LIKELY ARE YOU TO NOD OFF OR FALL ASLEEP WHILE WATCHING TV: 3
HOW LIKELY ARE YOU TO NOD OFF OR FALL ASLEEP WHILE LYING DOWN TO REST IN THE AFTERNOON WHEN CIRCUMSTANCES PERMIT: 2
ESS TOTAL SCORE: 11
HOW LIKELY ARE YOU TO NOD OFF OR FALL ASLEEP WHILE SITTING INACTIVE IN A PUBLIC PLACE: 3
NECK CIRCUMFERENCE (INCHES): 17.5
HOW LIKELY ARE YOU TO NOD OFF OR FALL ASLEEP WHILE SITTING QUIETLY AFTER LUNCH WITHOUT ALCOHOL: 0
HOW LIKELY ARE YOU TO NOD OFF OR FALL ASLEEP WHILE SITTING AND TALKING TO SOMEONE: 0
HOW LIKELY ARE YOU TO NOD OFF OR FALL ASLEEP IN A CAR, WHILE STOPPED FOR A FEW MINUTES IN TRAFFIC: 0

## 2024-01-31 NOTE — TELEPHONE ENCOUNTER
Faxed nasal pillows mask order, CR, and ov notes to [facility] at [phone] via [employer]ax.

## 2024-01-31 NOTE — PROGRESS NOTES
ordered by treating provider- treatment of KENDELL can lower blood pressure by levels that are clinically significant.        Follow up 1 year sooner if needed

## 2025-02-05 ENCOUNTER — OFFICE VISIT (OUTPATIENT)
Dept: SLEEP MEDICINE | Age: 75
End: 2025-02-05
Payer: MEDICARE

## 2025-02-05 VITALS
DIASTOLIC BLOOD PRESSURE: 78 MMHG | WEIGHT: 199 LBS | HEIGHT: 61 IN | SYSTOLIC BLOOD PRESSURE: 130 MMHG | BODY MASS INDEX: 37.57 KG/M2 | HEART RATE: 88 BPM | RESPIRATION RATE: 16 BRPM | OXYGEN SATURATION: 96 %

## 2025-02-05 DIAGNOSIS — G47.33 SEVERE OBSTRUCTIVE SLEEP APNEA: Primary | ICD-10-CM

## 2025-02-05 DIAGNOSIS — I10 PRIMARY HYPERTENSION: ICD-10-CM

## 2025-02-05 DIAGNOSIS — R53.83 OTHER FATIGUE: ICD-10-CM

## 2025-02-05 DIAGNOSIS — Z71.89 CPAP USE COUNSELING: ICD-10-CM

## 2025-02-05 DIAGNOSIS — E66.9 OBESITY (BMI 30-39.9): ICD-10-CM

## 2025-02-05 PROCEDURE — 3078F DIAST BP <80 MM HG: CPT | Performed by: NURSE PRACTITIONER

## 2025-02-05 PROCEDURE — 99214 OFFICE O/P EST MOD 30 MIN: CPT | Performed by: NURSE PRACTITIONER

## 2025-02-05 PROCEDURE — 1123F ACP DISCUSS/DSCN MKR DOCD: CPT | Performed by: NURSE PRACTITIONER

## 2025-02-05 PROCEDURE — 1159F MED LIST DOCD IN RCRD: CPT | Performed by: NURSE PRACTITIONER

## 2025-02-05 PROCEDURE — 1160F RVW MEDS BY RX/DR IN RCRD: CPT | Performed by: NURSE PRACTITIONER

## 2025-02-05 PROCEDURE — 3075F SYST BP GE 130 - 139MM HG: CPT | Performed by: NURSE PRACTITIONER

## 2025-02-05 ASSESSMENT — SLEEP AND FATIGUE QUESTIONNAIRES
HOW LIKELY ARE YOU TO NOD OFF OR FALL ASLEEP WHILE SITTING AND TALKING TO SOMEONE: WOULD NEVER DOZE
ESS TOTAL SCORE: 6
HOW LIKELY ARE YOU TO NOD OFF OR FALL ASLEEP IN A CAR, WHILE STOPPED FOR A FEW MINUTES IN TRAFFIC: WOULD NEVER DOZE
HOW LIKELY ARE YOU TO NOD OFF OR FALL ASLEEP WHILE LYING DOWN TO REST IN THE AFTERNOON WHEN CIRCUMSTANCES PERMIT: MODERATE CHANCE OF DOZING
HOW LIKELY ARE YOU TO NOD OFF OR FALL ASLEEP WHILE SITTING QUIETLY AFTER LUNCH WITHOUT ALCOHOL: WOULD NEVER DOZE
HOW LIKELY ARE YOU TO NOD OFF OR FALL ASLEEP WHILE SITTING INACTIVE IN A PUBLIC PLACE: SLIGHT CHANCE OF DOZING
HOW LIKELY ARE YOU TO NOD OFF OR FALL ASLEEP WHILE SITTING AND READING: MODERATE CHANCE OF DOZING
HOW LIKELY ARE YOU TO NOD OFF OR FALL ASLEEP WHEN YOU ARE A PASSENGER IN A CAR FOR AN HOUR WITHOUT A BREAK: WOULD NEVER DOZE
HOW LIKELY ARE YOU TO NOD OFF OR FALL ASLEEP WHILE WATCHING TV: SLIGHT CHANCE OF DOZING

## 2025-02-05 NOTE — PROGRESS NOTES
CHIEF COMPLAINT: KENDELL    Consulting provider: Afsaneh Goetz, APRN - CNP    Corinnekandice Ramírez is a 74 y.o. female in office for KENDELL follow up. States she is doing okay with CPAP Patient is using CPAP 8-9 hrs/night. Not using humidifier. No snoring on CPAP. The pressure is well tolerated. The mask is comfortable- nasal pillows. No mask leak. No significant daytime sleepiness. No nodding off when driving. No dry nose or throat. +fatigue. Bedtime is 11 pm- MN and rise time is 730 am. Sleep onset is usually  few minutes. Wakes up 2 times at night total. 2 nocturia. It takes few minutes to fall back a sleep. Rare naps during the day. No headache in am. No weight gain. No caffienated beverages during the day. No alcohol. ESS is 6      Past Medical History:   Diagnosis Date    Arthritis     Hypertension     KENDELL on CPAP     Pneumonia        Past Surgical History:        Procedure Laterality Date    HERNIA REPAIR      KNEE SURGERY      ROTATOR CUFF REPAIR Right        Allergies:  is allergic to clindamycin, nabumetone, and naproxen.  Social History:    TOBACCO:   reports that she has never smoked. She has never used smokeless tobacco.  ETOH:   reports no history of alcohol use.      Family History:   No FH of KENDELL    Current Medications:    Current Outpatient Medications:     simvastatin (ZOCOR) 40 MG tablet, Take 1 tablet by mouth nightly, Disp: , Rfl:     ibuprofen (ADVIL;MOTRIN) 800 MG tablet, Take 1 tablet by mouth every 6 hours as needed for Pain, Disp: , Rfl:     albuterol sulfate  (90 Base) MCG/ACT inhaler, Inhale 2 puffs into the lungs, Disp: , Rfl:     Oxaprozin (DAYPRO PO), Take by mouth as needed, Disp: , Rfl:     Amlodipine Besy-Benazepril HCl (LOTREL PO), Take 1 tablet by mouth daily.  , Disp: , Rfl:     fexofenadine (ALLEGRA) 180 MG tablet, Take 1 tablet by mouth as needed, Disp: , Rfl:       Objective:   PHYSICAL EXAM:    Blood pressure 130/78, pulse 88, resp. rate 16, height 1.549 m (5' 1\"), weight